# Patient Record
Sex: FEMALE | Race: WHITE | Employment: UNEMPLOYED | URBAN - METROPOLITAN AREA
[De-identification: names, ages, dates, MRNs, and addresses within clinical notes are randomized per-mention and may not be internally consistent; named-entity substitution may affect disease eponyms.]

---

## 2024-11-12 ENCOUNTER — HOSPITAL ENCOUNTER (EMERGENCY)
Facility: HOSPITAL | Age: 49
Discharge: HOME | End: 2024-11-12
Attending: EMERGENCY MEDICINE
Payer: MEDICAID

## 2024-11-12 ENCOUNTER — APPOINTMENT (OUTPATIENT)
Dept: GENERAL RADIOLOGY | Facility: HOSPITAL | Age: 49
End: 2024-11-12
Attending: EMERGENCY MEDICINE
Payer: MEDICAID

## 2024-11-12 VITALS
HEART RATE: 82 BPM | DIASTOLIC BLOOD PRESSURE: 77 MMHG | OXYGEN SATURATION: 99 % | TEMPERATURE: 98 F | SYSTOLIC BLOOD PRESSURE: 129 MMHG | RESPIRATION RATE: 20 BRPM | WEIGHT: 293 LBS

## 2024-11-12 DIAGNOSIS — S89.91XA RIGHT LEG INJURY, INITIAL ENCOUNTER: ICD-10-CM
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
## 2024-11-12 PROCEDURE — 73560 X-RAY EXAM OF KNEE 1 OR 2: CPT | Performed by: EMERGENCY MEDICINE

## 2024-11-12 PROCEDURE — 99285 EMERGENCY DEPT VISIT HI MDM: CPT

## 2024-11-12 PROCEDURE — 99284 EMERGENCY DEPT VISIT MOD MDM: CPT

## 2024-11-12 PROCEDURE — 73610 X-RAY EXAM OF ANKLE: CPT | Performed by: EMERGENCY MEDICINE

## 2024-11-12 PROCEDURE — 96372 THER/PROPH/DIAG INJ SC/IM: CPT

## 2024-11-12 RX ORDER — KETOROLAC TROMETHAMINE 30 MG/ML
30 INJECTION, SOLUTION INTRAMUSCULAR; INTRAVENOUS ONCE
Status: COMPLETED | OUTPATIENT
Start: 2024-11-12 | End: 2024-11-12

## 2024-11-12 RX ORDER — KETOROLAC TROMETHAMINE 10 MG/1
10 TABLET, FILM COATED ORAL EVERY 6 HOURS PRN
Qty: 20 TABLET | Refills: 0 | Status: SHIPPED | OUTPATIENT
Start: 2024-11-12 | End: 2024-11-17

## 2024-11-12 RX ORDER — ACETAMINOPHEN 500 MG
1000 TABLET ORAL ONCE
Status: COMPLETED | OUTPATIENT
Start: 2024-11-12 | End: 2024-11-12

## 2024-11-12 NOTE — ED PROVIDER NOTES
Patient Seen in: [facility] Emergency Department    History     Chief Complaint   Patient presents with    Leg or Foot Injury     Stated Complaint:      HPI    49-year-old female with past medical history of obesity and otherwise without reported past medical history presenting by EMS for evaluation status post fall.  Patient was attempting to navigate curb with walker, slipping while transferring and with secondary right knee/ankle pain.  Patient with history of right ankle fracture versus sprain without reported operative management.  No head trauma or loss of consciousness.  No anticoagulant or antiplatelet use.    No past medical history on file.    No past surgical history on file.         No family history on file.         Review of Systems :  Constitutional: As per HPI  Musculoskeletal: (+) RLE pain.    Positive for stated complaint:   Other systems are as noted in HPI.  Constitutional and vital signs reviewed.      All other systems reviewed and negative except as noted above.    PSFH elements reviewed from today and agreed except as otherwise stated in HPI.    Physical Exam     ED Triage Vitals [11/12/24 1504]   /86   Pulse 89   Resp 21   Temp 97.6 °F (36.4 °C)   Temp src Temporal   SpO2 97 %   O2 Device None (Room air)       Current:/86   Pulse 89   Temp 97.6 °F (36.4 °C) (Temporal)   Resp 21   SpO2 97%         Physical Exam   Constitutional: No distress. Morbidly obese.  HEENT: MMM.  Head: Normocephalic. Atraumatic.  Eyes: No injection.   Cardiovascular: RLE with 2+ DP/PT pulses.  Pulmonary/Chest: Effort normal.   Musculoskeletal: No gross deformity. Right bimalleolar tenderness without obvious crepitance/fluctuance with intact skin/soft compartments.  Neurological: Alert. RLE with 5/5 strength proximally and distally.  Skin: Skin is warm.   Psychiatric: Cooperative.  Nursing note and vitals reviewed.        ED Course   Labs Reviewed - No data to display  XR ANKLE (MIN 3 VIEWS),  RIGHT (CPT=73610)    Result Date: 11/12/2024  PROCEDURE: XR ANKLE (MIN 3 VIEWS), RIGHT (CPT=73610)  COMPARISON: None.  INDICATIONS: Right ankle pain post fall today.  TECHNIQUE: 3 views were obtained.           CONCLUSION:  1. No acute fracture or subluxation. 2. Partly visualized intramedullary nail in the tibia.  No other significant finding.    Dictated by (CST): Waldemar Dorado MD on 11/12/2024 at 3:54 PM     Finalized by (CST): Waldemar Dorado MD on 11/12/2024 at 3:56 PM          XR KNEE (1 OR 2 VIEWS), RIGHT (CPT=73560)    Result Date: 11/12/2024  PROCEDURE: XR KNEE (1 OR 2 VIEWS), RIGHT (CPT=73560)  COMPARISON: None.  INDICATIONS: Right knee pain post fall today.  TECHNIQUE: 2 views were obtained.   FINDINGS:  BONES: No acute appearing fracture or dislocation.  Moderate degenerative narrowing of the patellofemoral joint and mild narrowing of the medial joint space.  Partially visualized internal fixation screw in the right femoral shaft. SOFT TISSUES: Negative. No visible soft tissue swelling. EFFUSION: None visible. OTHER: Negative.         CONCLUSION:  1. No acute appearing fracture or dislocation.  Moderate degenerative narrowing of the patellofemoral joint and mild narrowing of the medial joint space.    Dictated by (CST): Deshawn Madrid MD on 11/12/2024 at 3:55 PM     Finalized by (CST): eDshawn Madrid MD on 11/12/2024 at 3:56 PM           ED Course as of 11/12/24 1710  ------------------------------------------------------------  Time: 11/12 1700  Comment: Ambulatory to/from bathroom.       [facility]   DIFFERENTIAL DIAGNOSIS: After history and physical exam differential diagnosis includes but is not limited to fracture, sprain/strain, contusion.    Pulse ox: 97%:Normal on RA, as independently interpreted by myself    Medical Decision Making  Evaluation for right ankle pain status post mechanical fall without other traumatic complaints or proximal extremity complaints without compartments and without  neurovascular compromise.  Radiography nonacute, patient ambulatory after medications-stable for discharge with symptomatic care and ongoing outpatient follow-up.    Problems Addressed:  Fall, initial encounter: complicated acute illness or injury  Right leg injury, initial encounter: acute illness or injury    Amount and/or Complexity of Data Reviewed  Independent Historian: EMS     Details: Collateral history obtained from EMS  Radiology: ordered and independent interpretation performed. Decision-making details documented in ED Course.     Details: XR without obvious dislocation as independently interpreted by myself    Risk  Prescription drug management.        I was wearing at minimum a facemask and eye protection throughout this encounter with handwashing performed prior and after patient evaluation without personal hand/facial/oropharyngeal contact and gloves worn throughout encounter. See note and/or contact this provider for further PPE details.    Disposition and Plan     Clinical Impression:  1. Fall, initial encounter    2. Right leg injury, initial encounter        Disposition:  Discharge    Follow-up:  Tracey Jimenez MD  [address]  [facility] [address]  [phone]    Call  For followup and re-evaluation.      Medications Prescribed:  Current Discharge Medication List        START taking these medications    Details   Ketorolac Tromethamine 10 MG Oral Tab Take 1 tablet (10 mg total) by mouth every 6 (six) hours as needed for Pain. Avoid taking other NSAIDs while on this medication including naproxen/aleve, ibuprofen/motrin, meloxicam/mobic. Patient received dose of parenteral ketorolac in the Emergency Department.  Qty: 20 tablet, Refills: 0      diclofenac 1 % External Gel Apply 2 g topically 4 (four) times daily as needed.  Qty: 100 g, Refills: 0

## 2024-11-12 NOTE — ED INITIAL ASSESSMENT (HPI)
Pt here via EMS after a fall. Pt stated she tripped over herself and fell. Says her right ankle isn't working as well recently. Pt uses a walker.